# Patient Record
Sex: FEMALE | Race: WHITE | NOT HISPANIC OR LATINO | URBAN - METROPOLITAN AREA
[De-identification: names, ages, dates, MRNs, and addresses within clinical notes are randomized per-mention and may not be internally consistent; named-entity substitution may affect disease eponyms.]

---

## 2021-02-11 ENCOUNTER — EMERGENCY (EMERGENCY)
Facility: HOSPITAL | Age: 71
LOS: 1 days | Discharge: HOME | End: 2021-02-11
Attending: EMERGENCY MEDICINE | Admitting: EMERGENCY MEDICINE
Payer: MEDICARE

## 2021-02-11 VITALS
RESPIRATION RATE: 17 BRPM | WEIGHT: 147.93 LBS | HEIGHT: 62 IN | OXYGEN SATURATION: 99 % | HEART RATE: 122 BPM | SYSTOLIC BLOOD PRESSURE: 196 MMHG | TEMPERATURE: 97 F | DIASTOLIC BLOOD PRESSURE: 108 MMHG

## 2021-02-11 DIAGNOSIS — Z88.0 ALLERGY STATUS TO PENICILLIN: ICD-10-CM

## 2021-02-11 DIAGNOSIS — R04.0 EPISTAXIS: ICD-10-CM

## 2021-02-11 LAB
ALBUMIN SERPL ELPH-MCNC: 4.8 G/DL — SIGNIFICANT CHANGE UP (ref 3.3–5)
ALP SERPL-CCNC: 67 U/L — SIGNIFICANT CHANGE UP (ref 40–120)
ALT FLD-CCNC: 23 U/L — SIGNIFICANT CHANGE UP (ref 10–45)
ANION GAP SERPL CALC-SCNC: 10 MMOL/L — SIGNIFICANT CHANGE UP (ref 5–17)
APTT BLD: 33.5 SEC — SIGNIFICANT CHANGE UP (ref 27.5–35.5)
AST SERPL-CCNC: 21 U/L — SIGNIFICANT CHANGE UP (ref 10–40)
BASOPHILS # BLD AUTO: 0.03 K/UL — SIGNIFICANT CHANGE UP (ref 0–0.2)
BASOPHILS NFR BLD AUTO: 0.4 % — SIGNIFICANT CHANGE UP (ref 0–2)
BILIRUB SERPL-MCNC: 0.4 MG/DL — SIGNIFICANT CHANGE UP (ref 0.2–1.2)
BUN SERPL-MCNC: 10 MG/DL — SIGNIFICANT CHANGE UP (ref 7–23)
CALCIUM SERPL-MCNC: 9.9 MG/DL — SIGNIFICANT CHANGE UP (ref 8.4–10.5)
CHLORIDE SERPL-SCNC: 101 MMOL/L — SIGNIFICANT CHANGE UP (ref 96–108)
CO2 SERPL-SCNC: 24 MMOL/L — SIGNIFICANT CHANGE UP (ref 22–31)
CREAT SERPL-MCNC: 0.54 MG/DL — SIGNIFICANT CHANGE UP (ref 0.5–1.3)
EOSINOPHIL # BLD AUTO: 0.04 K/UL — SIGNIFICANT CHANGE UP (ref 0–0.5)
EOSINOPHIL NFR BLD AUTO: 0.6 % — SIGNIFICANT CHANGE UP (ref 0–6)
GLUCOSE SERPL-MCNC: 119 MG/DL — HIGH (ref 70–99)
HCT VFR BLD CALC: 35.5 % — SIGNIFICANT CHANGE UP (ref 34.5–45)
HGB BLD-MCNC: 11.8 G/DL — SIGNIFICANT CHANGE UP (ref 11.5–15.5)
IMM GRANULOCYTES NFR BLD AUTO: 0.3 % — SIGNIFICANT CHANGE UP (ref 0–1.5)
INR BLD: 1.04 — SIGNIFICANT CHANGE UP (ref 0.88–1.16)
LYMPHOCYTES # BLD AUTO: 1.61 K/UL — SIGNIFICANT CHANGE UP (ref 1–3.3)
LYMPHOCYTES # BLD AUTO: 23 % — SIGNIFICANT CHANGE UP (ref 13–44)
MCHC RBC-ENTMCNC: 30.1 PG — SIGNIFICANT CHANGE UP (ref 27–34)
MCHC RBC-ENTMCNC: 33.2 GM/DL — SIGNIFICANT CHANGE UP (ref 32–36)
MCV RBC AUTO: 90.6 FL — SIGNIFICANT CHANGE UP (ref 80–100)
MONOCYTES # BLD AUTO: 0.34 K/UL — SIGNIFICANT CHANGE UP (ref 0–0.9)
MONOCYTES NFR BLD AUTO: 4.9 % — SIGNIFICANT CHANGE UP (ref 2–14)
NEUTROPHILS # BLD AUTO: 4.97 K/UL — SIGNIFICANT CHANGE UP (ref 1.8–7.4)
NEUTROPHILS NFR BLD AUTO: 70.8 % — SIGNIFICANT CHANGE UP (ref 43–77)
NRBC # BLD: 0 /100 WBCS — SIGNIFICANT CHANGE UP (ref 0–0)
PLATELET # BLD AUTO: 228 K/UL — SIGNIFICANT CHANGE UP (ref 150–400)
POTASSIUM SERPL-MCNC: 4.1 MMOL/L — SIGNIFICANT CHANGE UP (ref 3.5–5.3)
POTASSIUM SERPL-SCNC: 4.1 MMOL/L — SIGNIFICANT CHANGE UP (ref 3.5–5.3)
PROT SERPL-MCNC: 7.6 G/DL — SIGNIFICANT CHANGE UP (ref 6–8.3)
PROTHROM AB SERPL-ACNC: 12.4 SEC — SIGNIFICANT CHANGE UP (ref 10.6–13.6)
RBC # BLD: 3.92 M/UL — SIGNIFICANT CHANGE UP (ref 3.8–5.2)
RBC # FLD: 11.9 % — SIGNIFICANT CHANGE UP (ref 10.3–14.5)
SODIUM SERPL-SCNC: 135 MMOL/L — SIGNIFICANT CHANGE UP (ref 135–145)
WBC # BLD: 7.01 K/UL — SIGNIFICANT CHANGE UP (ref 3.8–10.5)
WBC # FLD AUTO: 7.01 K/UL — SIGNIFICANT CHANGE UP (ref 3.8–10.5)

## 2021-02-11 PROCEDURE — 99285 EMERGENCY DEPT VISIT HI MDM: CPT

## 2021-02-11 PROCEDURE — 70450 CT HEAD/BRAIN W/O DYE: CPT | Mod: 26,MC

## 2021-02-11 RX ORDER — TRANEXAMIC ACID 100 MG/ML
5 INJECTION, SOLUTION INTRAVENOUS ONCE
Refills: 0 | Status: COMPLETED | OUTPATIENT
Start: 2021-02-11 | End: 2021-02-11

## 2021-02-11 RX ADMIN — TRANEXAMIC ACID 5 MILLILITER(S): 100 INJECTION, SOLUTION INTRAVENOUS at 22:36

## 2021-02-11 NOTE — ED PROVIDER NOTE - CARE PROVIDERS DIRECT ADDRESSES
[email or web address]

## 2021-02-11 NOTE — ED PROVIDER NOTE - PROVIDER TOKENS
PROVIDER:[TOKEN:[[id number]]],PROVIDER:[TOKEN:[[id number]]] PROVIDER:[TOKEN:[[id number]]],PROVIDER:[TOKEN:[[id number]]],PROVIDER:[TOKEN:[[id number]]]

## 2021-02-11 NOTE — ED ADULT TRIAGE NOTE - CHIEF COMPLAINT QUOTE
pt c/o nose bleed x1 week, seen at [facility] on sunday, f/u with ENT today, "they used a laser to stop the bleeding, the bleeding lightened, but now its heavier again."

## 2021-02-11 NOTE — ED PROVIDER NOTE - PROGRESS NOTE DETAILS
ENT - placed longer rhinorhocket on right nares - will come back and reevaluate pt Mor; seen by ENT, packed w resolution of bleeding, believe anterior, no posterior signs, bleeding in OP. Return prec given to pt by ED and ENT.

## 2021-02-11 NOTE — ED PROVIDER NOTE - PATIENT PORTAL LINK FT
You can access the FollowMyHealth Patient Portal offered by [facility] by registering at the following website: [email or web address]. By joining [employer]’s FollowMyHealth portal, you will also be able to view your health information using other applications (apps) compatible with our system.

## 2021-02-11 NOTE — ED PROVIDER NOTE - CLINICAL SUMMARY MEDICAL DECISION MAKING FREE TEXT BOX
69 y/o F pt presents to ED with epistaxis. Will apply TXA to nares to achieve hemostasis. Pt and family requesting CT head, CT ordered, will reassess.

## 2021-02-11 NOTE — ED PROVIDER NOTE - CARE PROVIDER_API CALL
Remy Ugarte  FACIAL PLASTIC AND RECONSTRUCTIVE SURGERY  [address] [place] [address], [address]  [place] [address]  Phone: [phone]  Fax: [phone]  Follow Up Time:     Broderick Crouch)  Otolaryngology  [address] [place] [address]  Phone: [phone]  Fax: [phone]  Follow Up Time:    Remy Ugarte  FACIAL PLASTIC AND RECONSTRUCTIVE SURGERY  [address] [place] [address], [address]  [place] [address]  Phone: [phone]  Fax: [phone]  Follow Up Time:     Broderick Crouch)  Otolaryngology  [address] [place] [address]  Phone: [phone]  Fax: [phone]  Follow Up Time:     Eugenia Ragland)  Otolaryngology  [address] [place] [address]  Phone: [phone]  Fax: [phone]  Follow Up Time:

## 2021-02-11 NOTE — ED PROVIDER NOTE - OBJECTIVE STATEMENT
69 y/o F pt with no pertinent PMHx and PSHx presents to ED c/o persistent epistaxis that has been intermittent x 1 week. Pt was seen at [facility] and treated there, but was discharged with persistent symptoms. Pt was seen by ENT today, and per pt, her nasal area was cauterized but she still had some persistent bleeding. Pt with minimal bleeding, but no active bleed in nares at this time. Pt denies any other acute complaints.

## 2021-02-11 NOTE — ED ADULT NURSE NOTE - OBJECTIVE STATEMENT
nose bleeding for 7 days, went to other ED hospital 3 days ago then sent to ENT today and laser done in her 2 nostrils

## 2021-02-11 NOTE — ED ADULT NURSE NOTE - CHIEF COMPLAINT QUOTE
pt c/o nose bleed x1 week, seen at [facility] on sunday, f/u with ENT today, "they used a laser to stop the bleeding, the bleeding lightened, but now its heavier again."

## 2021-02-11 NOTE — ED PROVIDER NOTE - ENMT, MLM
Airway patent. Mouth with normal mucosa. Throat has no vesicles, no oropharyngeal exudates and uvula is midline. Blood tinged tissue from R nare, no active perfuse bleeding, no focal findings.

## 2021-02-12 VITALS
HEART RATE: 84 BPM | RESPIRATION RATE: 18 BRPM | TEMPERATURE: 98 F | DIASTOLIC BLOOD PRESSURE: 90 MMHG | SYSTOLIC BLOOD PRESSURE: 140 MMHG | OXYGEN SATURATION: 97 %

## 2021-02-12 PROCEDURE — 30903 CONTROL OF NOSEBLEED: CPT | Mod: RT

## 2021-02-12 PROCEDURE — 80053 COMPREHEN METABOLIC PANEL: CPT

## 2021-02-12 PROCEDURE — 85730 THROMBOPLASTIN TIME PARTIAL: CPT

## 2021-02-12 PROCEDURE — 99284 EMERGENCY DEPT VISIT MOD MDM: CPT | Mod: 25

## 2021-02-12 PROCEDURE — 85610 PROTHROMBIN TIME: CPT

## 2021-02-12 PROCEDURE — 70450 CT HEAD/BRAIN W/O DYE: CPT

## 2021-02-12 PROCEDURE — 36415 COLL VENOUS BLD VENIPUNCTURE: CPT

## 2021-02-12 PROCEDURE — 85025 COMPLETE CBC W/AUTO DIFF WBC: CPT

## 2021-02-12 NOTE — CONSULT NOTE ADULT - SUBJECTIVE AND OBJECTIVE BOX
HPI: 69 y/o F pt with no pertinent PMHx and PSHx presents to ED c/o persistent epistaxis that has been intermittent x 1 week. Pt was seen at [facility] and treated there, but was discharged with persistent symptoms. Pt was seen by ENT on 2/11, and per pt, her nasal area was cauterized but she still had some persistent bleeding. in [facility] ED, L merocel and R rhino (anterior) inserted. patient has bleeding R>L at this time, with clots from mouth    Allergies  penicillin (Unknown)    PAST MEDICAL & SURGICAL HISTORY:  No pertinent past medical history    Vital Signs Last 24 Hrs  T(C): 36.4 (12 Feb 2021 00:58), Max: 36.9 (11 Feb 2021 23:43)  T(F): 97.5 (12 Feb 2021 00:58), Max: 98.4 (11 Feb 2021 23:43)  HR: 104 (12 Feb 2021 00:58) (90 - 122)  BP: 150/99 (12 Feb 2021 00:58) (136/90 - 196/108)  BP(mean): --  RR: 18 (12 Feb 2021 00:58) (17 - 18)  SpO2: 97% (12 Feb 2021 00:58) (97% - 99%)    LABS:  CBC-                        11.8   7.01  )-----------( 228      ( 11 Feb 2021 21:54 )             35.5         02-11    135  |  101  |  10  ----------------------------<  119<H>  4.1   |  24  |  0.54    Ca    9.9      11 Feb 2021 21:54    TPro  7.6  /  Alb  4.8  /  TBili  0.4  /  DBili  x   /  AST  21  /  ALT  23  /  AlkPhos  67  02-11    Coagulation Studies-  PT/INR - ( 11 Feb 2021 21:54 )   PT: 12.4 sec;   INR: 1.04          PTT - ( 11 Feb 2021 21:54 )  PTT:33.5 sec  Endocrine Panel-  --  --  9.9 mg/dL        PHYSICAL EXAM:  aaox3  no resp distress or sob  R rhino anterior and L merocel seen - their tips are extruding out of nares    anterior rhinoscopy: R rhino and L merocel removed. R anterior nasal septum with cauterization field on septum. slight ooze from around the edge of cauterization bed. on L, the NC is dry with small amounts of clots seen. there is erythema on the nasal septum. on oropharyngeal exam there is tricking of blood from the R side.     procedure: inserted on posterior (long) rhino rocket to the R nare. patient tolerated procedure well. 4cc of air injected    A/P:  70y Female w epistaxis for 1 week, s/p cauterization of R anterior nasal septum on 2/12/21  - now s/p R rhino rocket (posterior, long)  - will reassess in 1-2h. if no longer bleeding, patient may be dc home  - keflex while rhino rocket is in  - to keep rhino rocket in place for at least 2 days. remove with PCP or ENT outpatient.  - - Nasal saline, 2 sprays to both nares 4 times a day  - vaseline, via cotton tip to inside tip of each nostril two times day  - Strict blood pressure control  - Avoid nasal trauma; no nose rubbing, blowing or manipulating nasal packing.  Sneeze with mouth open and pinching nares.  - Avoid bending with head blow the waist.    -No heavy lifting.  -follow up with PCP regarding anticoagulation risks/benefits   -page/call with questions       Thank you for the consult, please page ENT at [phone] with any questions/concerns.  -------------------------------------------------  Daquan Doan MD  Department of Otolaryngology - Head and Neck Surgery  [facility]

## 2021-02-12 NOTE — PROGRESS NOTE ADULT - SUBJECTIVE AND OBJECTIVE BOX
STATUS POST:  70y Female w epistaxis. retrospective entry   seen earlier by ENT s/p rhino rocket. now with oozing through R nasolacrimal duct and around R rhino rocket.    Hospital Course:  02-12-21 @ 18:55    Vital Signs Last 24 Hrs  T(C): 36.7 (12 Feb 2021 07:55), Max: 36.9 (11 Feb 2021 23:43)  T(F): 98 (12 Feb 2021 07:55), Max: 98.4 (11 Feb 2021 23:43)  HR: 84 (12 Feb 2021 07:55) (84 - 122)  BP: 140/90 (12 Feb 2021 07:55) (136/90 - 196/108)  BP(mean): --  RR: 18 (12 Feb 2021 07:55) (17 - 18)  SpO2: 97% (12 Feb 2021 07:55) (97% - 99%)    MEDICATIONS  (STANDING):    P/E  rhino rocket removed  fiberoptic exam: patient scoped, oozing seen from head of inferior turbinate on the R. on the L, no bleeding seen                          11.8   7.01  )-----------( 228      ( 11 Feb 2021 21:54 )             35.5     02-11    135  |  101  |  10  ----------------------------<  119<H>  4.1   |  24  |  0.54    Ca    9.9      11 Feb 2021 21:54    TPro  7.6  /  Alb  4.8  /  TBili  0.4  /  DBili  x   /  AST  21  /  ALT  23  /  AlkPhos  67  02-11    LIVER FUNCTIONS - ( 11 Feb 2021 21:54 )  Alb: 4.8 g/dL / Pro: 7.6 g/dL / ALK PHOS: 67 U/L / ALT: 23 U/L / AST: 21 U/L / GGT: x           PT/INR - ( 11 Feb 2021 21:54 )   PT: 12.4 sec;   INR: 1.04          PTT - ( 11 Feb 2021 21:54 )  PTT:33.5 sec    PROCEDURE:   floseal and surgicel inserted into R nare      A/P: 70y Female w epistaxis that has resolved  - s/p resorbable packing to R nare  - Nasal saline, 2 sprays to both nares 4 times a day  - vaseline, via cotton tip to inside tip of each nostril two times day  - Strict blood pressure control  - Avoid nasal trauma; no nose rubbing, blowing or manipulating nasal packing.  Sneeze with mouth open and pinching nares.  - Avoid bending with head blow the waist.    -No heavy lifting.  -follow up with PCP regarding anticoagulation risks/benefits   -page/call with questions     ----------------------------------------------  Daquan Doan MD  Resident  Department of Otolaryngology - Head and Neck Surgery  [facility]

## 2021-02-19 PROBLEM — Z78.9 OTHER SPECIFIED HEALTH STATUS: Chronic | Status: ACTIVE | Noted: 2021-02-11

## 2021-03-02 ENCOUNTER — APPOINTMENT (OUTPATIENT)
Dept: OTOLARYNGOLOGY | Facility: CLINIC | Age: 71
End: 2021-03-02
Payer: MEDICARE

## 2021-03-02 VITALS
TEMPERATURE: 98.1 F | BODY MASS INDEX: 29.06 KG/M2 | WEIGHT: 148 LBS | SYSTOLIC BLOOD PRESSURE: 151 MMHG | OXYGEN SATURATION: 99 % | HEART RATE: 71 BPM | HEIGHT: 60 IN | DIASTOLIC BLOOD PRESSURE: 99 MMHG

## 2021-03-02 PROBLEM — Z00.00 ENCOUNTER FOR PREVENTIVE HEALTH EXAMINATION: Status: ACTIVE | Noted: 2021-03-02

## 2021-03-02 PROCEDURE — 99072 ADDL SUPL MATRL&STAF TM PHE: CPT

## 2021-03-02 PROCEDURE — 99203 OFFICE O/P NEW LOW 30 MIN: CPT | Mod: 25

## 2021-03-02 PROCEDURE — 31238 NSL/SINS NDSC SRG NSL HEMRRG: CPT | Mod: RT

## 2021-03-02 NOTE — PROCEDURE
[FreeTextEntry6] : We discussed the elevated risk of liberation of viral particles from the nasopharynx if the patient were to be asymptomatically infected with COVID-19; after weighing the risks & benefits the decision was made to proceed. The procedure was performed while wearing appropriate PPE and a camera attached to a video system was used to maximize separation from the patient. The scope was handled appropriately. \par Indication: inadequate access to bleeding site via anterior rhinoscopy\par After verbal consent and the administration of a small amount of an aerosolized phenylephrine/lidocaine mix, examination was performed with a flexible endoscope. Findings:\par Septum: R NSD with residual apparently absorbable packing material (removed w/ suction)\par A bleeding area was noted on the caudal mid-R septum and was cauterized w/ AgNO3 with good effect.\par This was tolerated well. Post-procedure care was discussed\par \par

## 2021-03-02 NOTE — HISTORY OF PRESENT ILLNESS
[de-identified] : Mandarin speaker, her sister requested to translate\par Seen at [facility] ED 2 wks ago w/ a R sided nosebleed that had lasted for 12d previously; she was ? packed and hasn't bled since except a little yesterday. No prior nosebleeds & not on blood thinners. Denies baseline nasal dyspnea but her nose feels dry. \par \par Covid-19 screening questions: \par   Sick contacts, recent international travel, shortness of breath, new or productive cough, fevers/chills/night sweats: no\par   COVID-19 testing: none\par   Vaccination: no\par

## 2021-03-02 NOTE — ASSESSMENT
[FreeTextEntry1] : Discussed keeping the anterior nasal mucosa moist with saline and bacitracin and consideration of a humidifier by the bedside at night; avoid digital trauma.\par RTC further bleeding

## 2021-03-02 NOTE — PHYSICAL EXAM
[Nasal Endoscopy Performed] : nasal endoscopy was performed, see procedure section for findings [] : septum deviated to the right [de-identified] : residual packing at mid-caudal septum noted [Normal] : no rashes

## 2022-03-28 ENCOUNTER — APPOINTMENT (OUTPATIENT)
Dept: OTOLARYNGOLOGY | Facility: CLINIC | Age: 72
End: 2022-03-28
Payer: MEDICARE

## 2022-03-28 VITALS
TEMPERATURE: 98.5 F | OXYGEN SATURATION: 96 % | BODY MASS INDEX: 28.47 KG/M2 | SYSTOLIC BLOOD PRESSURE: 164 MMHG | HEART RATE: 97 BPM | WEIGHT: 145 LBS | HEIGHT: 60 IN | DIASTOLIC BLOOD PRESSURE: 89 MMHG

## 2022-03-28 DIAGNOSIS — J34.2 DEVIATED NASAL SEPTUM: ICD-10-CM

## 2022-03-28 DIAGNOSIS — R04.0 EPISTAXIS: ICD-10-CM

## 2022-03-28 DIAGNOSIS — F45.8 OTHER SOMATOFORM DISORDERS: ICD-10-CM

## 2022-03-28 DIAGNOSIS — M26.609 UNSPECIFIED TEMPOROMANDIBULAR JOINT DISORDER: ICD-10-CM

## 2022-03-28 PROCEDURE — 99214 OFFICE O/P EST MOD 30 MIN: CPT

## 2022-03-28 RX ORDER — BACITRACIN 500 [IU]/G
500 OINTMENT TOPICAL
Qty: 1 | Refills: 2 | Status: ACTIVE | COMMUNITY
Start: 2021-03-02 | End: 1900-01-01

## 2022-03-28 NOTE — ASSESSMENT
[FreeTextEntry1] : Reinforced keeping the nose moist. \par \par Discussed conservative TMJ treatment including hot soaks, NSAIDs, and chewing avoidance. Asked the patient to confer with their dentist regarding a possible night splint. Discussed bruxism & its possible contribution to TMJD. Reviewed relaxation exercises & a possible trial of muscle relaxants as well as possible eventual botox.\par

## 2024-01-23 NOTE — ED ADULT NURSE NOTE - HIV OFFER
Quality 110: Preventive Care And Screening: Influenza Immunization: Influenza Immunization not Administered because Patient Refused. Quality 111:Pneumonia Vaccination Status For Older Adults: Patient received any pneumococcal conjugate or polysaccharide vaccine on or after their 60th birthday and before the end of the measurement period Detail Level: Detailed Opt out

## 2024-08-22 ENCOUNTER — NON-APPOINTMENT (OUTPATIENT)
Age: 74
End: 2024-08-22

## 2024-08-23 ENCOUNTER — APPOINTMENT (OUTPATIENT)
Age: 74
End: 2024-08-23
Payer: MEDICARE

## 2024-08-23 VITALS
BODY MASS INDEX: 29.45 KG/M2 | HEART RATE: 71 BPM | OXYGEN SATURATION: 98 % | DIASTOLIC BLOOD PRESSURE: 92 MMHG | WEIGHT: 150 LBS | SYSTOLIC BLOOD PRESSURE: 158 MMHG | HEIGHT: 60 IN

## 2024-08-23 DIAGNOSIS — M54.2 CERVICALGIA: ICD-10-CM

## 2024-08-23 DIAGNOSIS — R07.81 PLEURODYNIA: ICD-10-CM

## 2024-08-23 DIAGNOSIS — M54.6 PAIN IN THORACIC SPINE: ICD-10-CM

## 2024-08-23 PROCEDURE — 99204 OFFICE O/P NEW MOD 45 MIN: CPT

## 2024-08-23 NOTE — DISCUSSION/SUMMARY
[de-identified] : I examined, evaluated, and discussed with the patient. The patient has neck symptoms for years. She also has bilateral arm to finger pain/tingling. She also has severe thoracic symptom.  Based on physical exam and image findings, the patient diagnosis is cervical degenerative disc disease C3-4, C4-5, C5-6, and C6-7, and foraminal stenosis at C4-5, C5-6 and C6-7.  Treatment plan is CT of neck and thoracic spine. Cervical spine CT is necessary to decide surgical procedure. Thoracic spine CT is necessary because she has intractable severe thoracic back and rib pain for years.   The patient understands everything and all questions were answered. The patient will return after CT.

## 2024-08-23 NOTE — PHYSICAL EXAM
[de-identified] : The patient is alert, cooperative, and oriented x 3. Pupils are equal and round. The patient does not have skin rash.   Gait is normal. Neck ROM is limited with pain. Tenderness at PVM. No myelopathy hand sign. Spurling test negative. DTR normal range. Motor and sensory are basically normal throughout bilateral U/E and L/E. Circulation of legs is normal. Bladder and bowel functions are normal. [de-identified] : Cerv[place] spine [place] [facility] [place] degenerative disc disease C3-4, C4-5, C5-6, and C6-7, and foraminal stenosis at C4-5, C5-6 and C6-7.

## 2024-08-23 NOTE — HISTORY OF PRESENT ILLNESS
[de-identified] : The patient is 73 years old female who has neck pain for years. The patient also has bilateral arm pain/tingling. She also has headache. No clear cause of the symptoms.  She also complains thoracic back pain and pain at sternum area. The patient is referred by Dr. Etienne.   Pain Level:  9 Duration of Symptoms:  Years Symptom course:  Getting worse Accident:  No   Previous Treatment:    Pain meds:  Yes    Physical therapy:  Yes      Epidural steroid injection:  No

## 2024-08-29 ENCOUNTER — APPOINTMENT (OUTPATIENT)
Dept: CT IMAGING | Facility: CLINIC | Age: 74
End: 2024-08-29

## 2024-09-03 ENCOUNTER — APPOINTMENT (OUTPATIENT)
Dept: CT IMAGING | Facility: CLINIC | Age: 74
End: 2024-09-03
Payer: MEDICARE

## 2024-09-03 PROCEDURE — 72128 CT CHEST SPINE W/O DYE: CPT

## 2024-09-03 PROCEDURE — 72125 CT NECK SPINE W/O DYE: CPT
